# Patient Record
Sex: FEMALE | Race: BLACK OR AFRICAN AMERICAN
[De-identification: names, ages, dates, MRNs, and addresses within clinical notes are randomized per-mention and may not be internally consistent; named-entity substitution may affect disease eponyms.]

---

## 2018-01-23 ENCOUNTER — HOSPITAL ENCOUNTER (OUTPATIENT)
Dept: HOSPITAL 88 - MAMMO | Age: 68
End: 2018-01-23
Attending: INTERNAL MEDICINE
Payer: MEDICARE

## 2018-01-23 DIAGNOSIS — Z12.31: Primary | ICD-10-CM

## 2018-12-11 ENCOUNTER — HOSPITAL ENCOUNTER (OUTPATIENT)
Dept: HOSPITAL 88 - MAMMO | Age: 68
End: 2018-12-11
Attending: INTERNAL MEDICINE
Payer: MEDICARE

## 2018-12-11 DIAGNOSIS — Z12.31: Primary | ICD-10-CM

## 2018-12-11 PROCEDURE — 77067 SCR MAMMO BI INCL CAD: CPT

## 2018-12-20 NOTE — DIAGNOSTIC IMAGING REPORT
#[id number] - MGSCRBIL

#BILATERAL DIGITAL SCREENING MAMMOGRAM WITH CAD: 12/11/2018

CLINICAL: Routine screening.  



Comparison is made to exams dated:  1/23/2018 mammogram and 3/31/2015 mammogram - [facility].  Current study contains 4 films.  

There are scattered fibroglandular elements in both breasts.  

Current study was also evaluated with a Computer Aided Detection (CAD) system.  

There are scar markers on both breasts.  Lymph nodes in the left axilla have a benign appearance.

No significant masses, calcifications, or other findings are seen in either breast.  

There has been no significant interval change.



IMPRESSION: BENIGN

There is no mammographic evidence of malignancy.  A 1 year screening mammogram is recommended. 

The patient will be notified by letter of the results.  





Martin Presley Jr., D.O.          

cw/:12/19/2018 11:22:34  



Imaging Technologist: Tahmina AMEZQUITA)(EVELIO), [facility]

letter sent: Compared to Prior B9  

Mammogram BI-RADS: 2 Benign

## 2019-03-28 NOTE — DIAGNOSTIC IMAGING REPORT
EXAM: CHEST 2 VIEWS, PA and lateral

DATE: 3/28/2019 Time stamp on exam: 1:30 PM

INDICATION: Bronchitis

COMPARISON: None



FINDINGS:

LINES/TUBES: None



LUNGS: No consolidations or edema. 



PLEURA: No effusions or pneumothorax.



HEART AND MEDIASTINUM: Normal size and contour.



BONES AND SOFT TISSUES: No acute findings. Mild degenerative changes and mild

wedging of a mid thoracic spine vertebral body.



IMPRESSION:

No acute thoracic abnormality.











Signed by: Dr. Martin Presley DO on 3/28/2019 1:59 PM

## 2020-03-18 NOTE — OPERATIVE REPORT
DATE OF PROCEDURE:  03/18/2020

 

SURGEON:  Joshua Hernandez MD

 

PROCEDURE:  Colonoscopy with polypectomy.

 

INDICATIONS FOR COLONOSCOPY:  Colorectal cancer screening.

 

MEDICATIONS:  The patient was done under MAC, please see anesthesiologist's note.

 

PROCEDURE IN DETAIL:  With the patient in the left lateral decubitus position, a

flexible fiberoptic Olympus colonoscope was inserted into the rectum with ease and

advanced all the way to the cecum.  It was then withdrawn slowly, mucosa overlying the

cecum appeared to be within normal limits.  One minute polyp was hot biopsied from the

ascending colon and site was hemoclipped.  The rest of the ascending and transverse

appeared to be within normal limits.  Approximately 8 mm sessile polyp was removed per

snare electrocautery and site was hemoclipped in the descending colon.  Some

diverticular disease was noted in the distal descending and proximal sigmoid.  The rest

of the sigmoid as well as the rectum appeared to be within normal limits.  The scope was

then retroflexed into the distal rectum and the area around the dentate line appeared to

be within normal limits.  The scope was then straightened out, it was subsequently

withdrawn, and the patient tolerated the procedure well. 

 

IMPRESSION:  

1. Ascending colon polyp, hot biopsied and site hemoclipped.

2. Descending colon polyp, hot snared and site hemoclipped.

3. Diverticulosis.

 

PLAN:  Follow up histology.  Initiate high-fiber, low-fat diet.  Initiate high-fiber

supplement.  The patient might benefit from a followup colonoscopy in 3 to 5 years. 

 

 

 

 

______________________________

Joshua Hernandez MD

 

[facility]

D:  03/18/2020 10:52:06

T:  03/18/2020 12:55:40

Job #:  [id number]

 

cc:            Carmelo Velasco MD

## 2020-03-18 NOTE — XMS REPORT
Patient Summary Document

                             Created on: 2020



HEATHER SALAZAR

External Reference #: [id number]

: 1950

Sex: Female



Demographics







                          Address                   [address] [place]  [address]

 

                          Home Phone                [phone]

 

                          Preferred Language        Unknown

 

                          Marital Status            Unknown

 

                          Anabaptist Affiliation     Unknown

 

                          Race                      Unknown

 

                                        Additional Race(s)  

 

                          Ethnic Group              Unknown





Author







                          Author                    [facility]

 

                          [facility]

 

                          Address                   Unknown

 

                          Phone                     Unavailable







Care Team Providers







                    Care Team Member Name    Role                Phone

 

                    EVELIO ALARCON      Unavailable         Unavailable

 

                    RUBEN PAULINO     Unavailable         Unavailable

 

                    NORMA ESCOBEDO    Unavailable         Unavailable







Problems

This patient has no known problems.



Allergies, Adverse Reactions, Alerts

This patient has no known allergies or adverse reactions.



Medications

This patient has no known medications.



Results







           Test Description    Test Time    Test Comments    Text Results    Atomic Results    Result

 Comments

 

                MAMMOGRAPHY DIGITAL SCR BILAT    2020 11:19:00                                             

                                                             [place] [address]      Patient Name: HEATHER SALAZAR                         
         MR #: [id number]                     : 1950                   
               Age/Sex: 69/F  Acct #: [id number]                              
Req #: [id number]  Adm Physician:                                               
      Ordered by: SUZETTE ALARCON MD                            Report #: [id number]
[id number]        Location: MAMMO                                   Room/Bed:         
           
___________________________________________________________________________________________________
   Procedure: [id number] MG/MAMMOGRAPHY DIGITAL SCR BILAT  Exam Date: 20   
                        Exam Time: 1040                                         
    REPORT STATUS: Signed       #[id number] - MGSCRBIL   #BILATERAL DIGITAL 
SCREENING MAMMOGRAM WITH CAD: 2020   CLINICAL: Routine screening.        
Comparison is made to exams dated:  2018 mammogram, 2018 mammogram, 
2016 mammogram   and 3/31/2015 mammogram - [facility].  Current study contains 4 films.     There are scattered fibroglandular 
elements in both breasts.     Current study was also evaluated with a Computer 
Aided Detection (CAD) system.     There is a benign intramammary node in the 
left breast.     No significant masses, calcifications, or other findings are 
seen in either breast.        IMPRESSION: BENIGN   There is no mammographic 
evidence of malignancy.  A 1 year screening mammogram is recommended.    The 
patient will be notified by letter of the results.           LEN GUARDADO M.D. 
           ct/robyn:2020 16:32:56        Imaging Technologist: Tahmina AMEZQUITA)(EVELIO), [facility]   letter sent: Normal Exam
    Mammogram BI-RADS: 2 Benign     Dictated By: LEN GUARDADO MD  Elec
tronically Signed By: LEN GUARDADO MD on 20 1632  Transcribed By: ROBYN 
on 20 163       COPY TO:   SUZETTE ALARCON MD              

 

                CHEST 2 VIEWS    2019 13:58:00                                                             

                                             [place] [address]  
   Patient Name: HEATHER SALAZAR                                   MR #: 
[id number]                     : 1950                                  
Age/Sex: 68/F  Acct #: [id number]                              Req #: [id number]
[id number]  Adm Physician:                                                      
Ordered by: SUZETTE ALARCON MD                            Report #: [id number]    
   Location: [facility]                                     Room/Bed:                  
  
___________________________________________________________________________________________________
   Procedure: [id number] DX/CHEST 2 VIEWS  Exam Date: 19                   
        Exam Time: 1325                                              REPORT 
STATUS: Signed    EXAM: CHEST 2 VIEWS, PA and lateral   DATE: 3/28/2019 Time st
amp on exam: 1:30 PM   INDICATION: Bronchitis   COMPARISON: None      FINDINGS: 
 LINES/TUBES: None      LUNGS: No consolidations or edema.       PLEURA: No 
effusions or pneumothorax.      HEART AND MEDIASTINUM: Normal size and contour. 
    BONES AND SOFT TISSUES: No acute findings. Mild degenerative changes and 
mild   wedging of a mid thoracic spine vertebral body.      IMPRESSION:   No 
acute thoracic abnormality.                  Signed by: Dr. Sabrina Presley DO on
3/28/2019 1:59 PM        Dictated By: SABRINA PRESLEY DO  Electronically Signed 
By: SABRINA PRESLEY DO on 19 6660  Transcribed By: LOVE on 19 0376 
     COPY TO:   SUZETTE ALARCON MD              

 

                MAMMOGRAPHY DIGITAL SCR BILAT    2018 09:58:00                                             

                                                             [place] [address]      Patient Name: HEATHER SALAZAR                         
         MR #: [id number]                     : 1950                   
               Age/Sex: 68/F  Acct #: [id number]                              
Req #: [id number]  Adm Physician:                                               
      Ordered by: SUZETTE ALARCON MD                            Report #: [id number]
[id number]        Location: MAMMO                                   Room/Bed:         
           
___________________________________________________________________________________________________
   Procedure: [id number] MG/MAMMOGRAPHY DIGITAL SCR BILAT  Exam Date: 18   
                        Exam Time: 0920                                         
    REPORT STATUS: Signed       #[id number] - MGSCRBIL   #BILATERAL DIGITAL 
SCREENING MAMMOGRAM WITH CAD: 2018   CLINICAL: Routine screening.        
Comparison is made to exams dated:  2018 mammogram and 3/31/2015 mammogram 
- [facility].  Current study contains 4 films.     
There are scattered fibroglandular elements in both breasts.     Current study 
was also evaluated with a Computer Aided Detection (CAD) system.     There are 
scar markers on both breasts.  Lymph nodes in the left axilla have a benign 
appearance.   No significant masses, calcifications, or other findings are seen 
in either breast.     There has been no significant interval change.      
IMPRESSION: BENIGN   There is no mammographic evidence of malignancy.  A 1 year 
screening mammogram is recommended.    The patient will be notified by letter of
the results.           Sabrina Presley Jr., D.O.             cw/:2018 
11:22:34        Imaging Technologist: Tahmina RICHTER (R)(EVELIO), [facility]   letter sent: Compared to Prior B9     Mammogram BI-
RADS: 2 Benign     Dictated By: SABRINA PRESLEY DO  Electronically Signed By: 
SABRINA PRESLEY DO on 18 112  Transcribed By: ROBYN on 18 112      
COPY TO:   SUZETTE ALARCON MD              

 

                MAMMOGRAPHY DIGITAL SCR BILAT                                        [place] [address]      Patient Name: 
HEATHER SALAZAR   MR #: [id number]    : 1950 Age/Sex: 67/F  Acct #: 
[id number] Req #: [id number]  Adm Physician:     Ordered by: SUZETTE ALARCON MD
 Report #: [id number]   Location: MAMMO  Room/Bed:     
____________________________________________________________________________
_______________________    Procedure: [id number] MG/MAMMOGRAPHY DIGITAL SCR BILAT
 Exam Date: 18                            Exam Time: 1206       REPORT 
STATUS: Signed       #[id number] - MGSCRBIL   #BILATERAL DIGITAL SCREENING 
MAMMOGRAM WITH CAD: 2018   CLINICAL: Routine screening.        Comparison 
is made to exams dated:  2016 mammogram, 3/31/2015 mammogram and 3/26/2014
   mammogram - [facility].  Current study contains 4 
films.     There are scattered fibroglandular elements in both breasts.     
Current study was also evaluated with a Computer Aided Detection (CAD) system.  
  No significant masses, calcifications, or other findings are seen in either 
breast.     There is a scar marker on the left breast.   There has been no 
significant interval change.      IMPRESSION: NEGATIVE   There is no 
mammographic evidence of malignancy.  A 1 year screening mammogram is 
recommended.    The patient will be notified by letter of the results.          
Sabrina Presley Jr., D.O.             cw/:2018 12:31:26        Imaging 
Technologist: Tahmina AMEZQUITA)(VEELIO), [facility]   
letter sent: Compared to Prior B9     Mammogram BI-RADS: 1 Negative     Dictated
By: SABRINA PRESLEY DO  Electronically Signed By: SABRINA PRESLEY DO on 18 
1231  Transcribed By: ROBYN on 18 1231       COPY TO:   SUZETTE ALARCON MD 
                                         

 

                CHEST 2 VIEWS                                        [place] [address]      Patient Name: HEATHER SALAZAR   MR #: 
[id number]    : 1950 Age/Sex: 66/F  Acct #: [id number] Req #: [id number]
[id number]  Adm Physician:     Ordered by: RUBEN PAULINO MD  Report #: [id number]  
Location: OR  Room/Bed:     
_______________________________________________________________________________
____________________    Procedure: [id number] DX/CHEST 2 VIEWS  Exam Date: 
10/23/17                            Exam Time: 1215       REPORT STATUS: Signed 
  PROCEDURE:   CHEST 2 VIEWS   TECHNIQUE:   PA lateral chest   INDICATION:   
Preoperative chest x-ray for right ankle surgery   COMPARISON:   None.       
FINDINGS:   The lungs are clear and symmetrically inflated. No pleural 
effusions.    Normal heart size and central vasculature. Intact skeleton. Mild  
 degenerative disc disease.           CONCLUSION:   Normal.               
Dictated by:  Deshaun Jimenez M.D. on 10/23/2017 at 13:55        El
ectronically approved by:  Deshaun Jimenez M.D. on 10/23/2017 at    13:55   
            Dictated By: DESHAUN JIMENEZ MD  Electronically Signed By: DESHAUN JIMENEZ MD on 10/23/17 1358  Transcribed By: MONA on 10/23/17 135       COPY 
TO:   RUBEN PAULINO MD                   

 

                ANKLE 3 + VIEWS RIGHT                                        [place] [address]      Patient Name: HEATHER SALAZAR  
MR #: [id number]    : 1950 Age/Sex: 66/F  Acct #: [id number] Req #: 
[id number]  Adm Physician:     Ordered by: RADHA ESCOBEDO MD  Report #: [id number]
[id number]   Location: ER  Room/Bed:     
_____________________________________________________________________________
______________________    Procedure: [id number] DX/ANKLE 3 + VIEWS RIGHT  Exam 
Date: 10/14/17                            Exam Time: 1611       REPORT STATUS: 
Signed    ANKLE 3 + VIEWS RIGHT - 3 views      HISTORY:  Pain   COMPARISON: None
available.           FINDINGS:   See below.      IMPRESSION:    Mildly displaced
lateral malleolar fracture with associated mild soft tissue   swelling.   Ankle 
mortise is intact.      Signed by: Dr. Matthew De La Rosa MD on 10/14/2017 4:53 PM  
     Dictated By: MATTHEW DE LA ROSA MD  Electronically Signed By: MATTHEW DE LA ROSA MD 
on 10/14/17 1653  Transcribed By: LOVE on 10/14/17 1653       COPY TO:   
RADHA ESCOBEDO MD